# Patient Record
Sex: FEMALE | Race: BLACK OR AFRICAN AMERICAN | NOT HISPANIC OR LATINO | URBAN - METROPOLITAN AREA
[De-identification: names, ages, dates, MRNs, and addresses within clinical notes are randomized per-mention and may not be internally consistent; named-entity substitution may affect disease eponyms.]

---
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
## 2024-03-03 ENCOUNTER — HOSPITAL ENCOUNTER (EMERGENCY)
Facility: HOSPITAL | Age: 19
Discharge: HOME | End: 2024-03-03
Payer: COMMERCIAL

## 2024-03-03 VITALS
HEIGHT: 67 IN | TEMPERATURE: 98.2 F | RESPIRATION RATE: 18 BRPM | WEIGHT: 272 LBS | HEART RATE: 88 BPM | BODY MASS INDEX: 42.69 KG/M2 | SYSTOLIC BLOOD PRESSURE: 143 MMHG | OXYGEN SATURATION: 100 % | DIASTOLIC BLOOD PRESSURE: 86 MMHG

## 2024-03-03 DIAGNOSIS — J02.9 SORE THROAT: Primary | ICD-10-CM

## 2024-03-03 DIAGNOSIS — R68.89 FLU-LIKE SYMPTOMS: ICD-10-CM

## 2024-03-03 PROBLEM — E66.01 SEVERE OBESITY (MULTI): Status: ACTIVE | Noted: 2024-03-03

## 2024-03-03 PROBLEM — H93.A1 SUBJECTIVE PULSATILE TINNITUS OF RIGHT EAR: Status: ACTIVE | Noted: 2024-03-03

## 2024-03-03 PROBLEM — G43.909 MIGRAINE HEADACHE: Status: ACTIVE | Noted: 2024-03-03

## 2024-03-03 LAB
FLUAV RNA RESP QL NAA+PROBE: NOT DETECTED
FLUBV RNA RESP QL NAA+PROBE: NOT DETECTED
HCG UR QL IA.RAPID: NEGATIVE
HETEROPH AB SERPLBLD QL IA.RAPID: NEGATIVE
S PYO DNA THROAT QL NAA+PROBE: NOT DETECTED
SARS-COV-2 RNA RESP QL NAA+PROBE: NOT DETECTED

## 2024-03-03 PROCEDURE — 81025 URINE PREGNANCY TEST: CPT | Performed by: PHYSICIAN ASSISTANT

## 2024-03-03 PROCEDURE — 36415 COLL VENOUS BLD VENIPUNCTURE: CPT | Performed by: PHYSICIAN ASSISTANT

## 2024-03-03 PROCEDURE — 87636 SARSCOV2 & INF A&B AMP PRB: CPT | Performed by: PHYSICIAN ASSISTANT

## 2024-03-03 PROCEDURE — 2500000005 HC RX 250 GENERAL PHARMACY W/O HCPCS: Performed by: PHYSICIAN ASSISTANT

## 2024-03-03 PROCEDURE — 99283 EMERGENCY DEPT VISIT LOW MDM: CPT

## 2024-03-03 PROCEDURE — 87651 STREP A DNA AMP PROBE: CPT | Performed by: PHYSICIAN ASSISTANT

## 2024-03-03 PROCEDURE — 2500000001 HC RX 250 WO HCPCS SELF ADMINISTERED DRUGS (ALT 637 FOR MEDICARE OP): Performed by: PHYSICIAN ASSISTANT

## 2024-03-03 PROCEDURE — 86308 HETEROPHILE ANTIBODY SCREEN: CPT | Performed by: PHYSICIAN ASSISTANT

## 2024-03-03 RX ORDER — IBUPROFEN 600 MG/1
600 TABLET ORAL EVERY 8 HOURS PRN
Qty: 20 TABLET | Refills: 0 | Status: SHIPPED | OUTPATIENT
Start: 2024-03-03

## 2024-03-03 RX ORDER — ONDANSETRON 4 MG/1
4 TABLET, ORALLY DISINTEGRATING ORAL EVERY 8 HOURS PRN
Qty: 10 TABLET | Refills: 0 | Status: SHIPPED | OUTPATIENT
Start: 2024-03-03

## 2024-03-03 RX ORDER — IBUPROFEN 600 MG/1
600 TABLET ORAL ONCE
Status: COMPLETED | OUTPATIENT
Start: 2024-03-03 | End: 2024-03-03

## 2024-03-03 RX ORDER — ONDANSETRON 4 MG/1
4 TABLET, ORALLY DISINTEGRATING ORAL ONCE
Status: COMPLETED | OUTPATIENT
Start: 2024-03-03 | End: 2024-03-03

## 2024-03-03 RX ADMIN — ONDANSETRON 4 MG: 4 TABLET, ORALLY DISINTEGRATING ORAL at 05:38

## 2024-03-03 RX ADMIN — IBUPROFEN 600 MG: 600 TABLET, FILM COATED ORAL at 05:39

## 2024-03-03 ASSESSMENT — PAIN - FUNCTIONAL ASSESSMENT: PAIN_FUNCTIONAL_ASSESSMENT: 0-10

## 2024-03-03 ASSESSMENT — COLUMBIA-SUICIDE SEVERITY RATING SCALE - C-SSRS
2. HAVE YOU ACTUALLY HAD ANY THOUGHTS OF KILLING YOURSELF?: NO
6. HAVE YOU EVER DONE ANYTHING, STARTED TO DO ANYTHING, OR PREPARED TO DO ANYTHING TO END YOUR LIFE?: NO
1. IN THE PAST MONTH, HAVE YOU WISHED YOU WERE DEAD OR WISHED YOU COULD GO TO SLEEP AND NOT WAKE UP?: NO

## 2024-03-03 ASSESSMENT — PAIN DESCRIPTION - LOCATION: LOCATION: THROAT

## 2024-03-03 ASSESSMENT — PAIN SCALES - GENERAL: PAINLEVEL_OUTOF10: 9

## 2024-03-03 NOTE — ED TRIAGE NOTES
Patient presents to the ER with complaints of sore throat since Wed.  Patient endorses difficulty swallowing and throat is painful when talking and swallowing.

## 2024-03-03 NOTE — ED PROVIDER NOTES
Chief Complaint   Patient presents with    Sore Throat     HPI:   Amira Funk is an 18 y.o. female with migraine disorder who presents to the ED for evaluation of sore throat, body aches, nausea x 3 days.  Patient said around 2300 yesterday evening her symptoms got worse and it became significantly more painful to swallow.  She has tried hot tea and cough drops with no relief.  Rates her pain 8/10.  It is worse with swallowing and no relieving factors.  She endorses sick contacts.  LMP 2/9/2024 but is asking for pregnancy test.  Patient denies chest pain, shortness of breath, palpitations, weakness, syncope, abdominal pain, vomiting, diarrhea, dysuria, hematuria.    Medications: None  Soc HX:  No Known Allergies: NKDA  History reviewed. No pertinent past medical history.  History reviewed. No pertinent surgical history.  No family history on file.     Physical Exam  Vitals and nursing note reviewed.   Constitutional:       General: She is not in acute distress.     Appearance: Normal appearance. She is not ill-appearing or toxic-appearing.      Comments: Elevated BMI   HENT:      Head: Normocephalic and atraumatic.      Right Ear: Tympanic membrane, ear canal and external ear normal. No middle ear effusion. Tympanic membrane is not erythematous.      Left Ear: Tympanic membrane, ear canal and external ear normal.  No middle ear effusion. Tympanic membrane is not erythematous.      Nose: Congestion present.      Mouth/Throat:      Mouth: Mucous membranes are moist.      Pharynx: Uvula midline. Posterior oropharyngeal erythema and uvula swelling present.      Tonsils: No tonsillar exudate or tonsillar abscesses. 2+ on the right. 2+ on the left.   Eyes:      Pupils: Pupils are equal, round, and reactive to light.   Cardiovascular:      Rate and Rhythm: Normal rate and regular rhythm.      Pulses: Normal pulses.      Heart sounds: Normal heart sounds. No murmur heard.  Pulmonary:      Effort: Pulmonary effort is  normal. No respiratory distress.      Breath sounds: Normal breath sounds.   Abdominal:      General: Bowel sounds are normal.      Palpations: Abdomen is soft.      Tenderness: There is no abdominal tenderness. There is no guarding or rebound.   Musculoskeletal:         General: No deformity or signs of injury. Normal range of motion.      Cervical back: Normal range of motion.   Lymphadenopathy:      Cervical: Cervical adenopathy present.   Skin:     General: Skin is warm and dry.      Capillary Refill: Capillary refill takes less than 2 seconds.      Findings: No bruising.   Neurological:      Mental Status: She is alert.      Cranial Nerves: No cranial nerve deficit.      Comments: CN II through XII grossly intact     VS: As documented in the triage note and EMR flowsheet from this visit were reviewed.    External Records Reviewed: I reviewed recent and relevant outside records including: Reviewed neurology note 2/5/2024.  Patient seen for migraine disorder.  She started on Zoloft for migraine prophylaxis and anxiety treatment.  Advised to take ibuprofen 800 mg twice daily for headache.  Blood work ordered.  Encouraged her to start headache diary.  Lab work was obtained.  Vitamin D was low.  TSH is normal.  Renal function normal.      Medical Decision Making:   ED Course as of 03/03/24 0557   Sun Mar 03, 2024   0443 Vitals Reviewed: Afebrile. Hypertensive. Not tachypneic. No hypoxia.   [KA]   0503 Patient is a 18-year-old female presents to the ED for evaluation of sore throat and other viral type symptoms.  On exam tonsils are symmetrically enlarged without exudate.  Bilateral tonsillar lymphadenopathy.  Abdomen is soft and nontender.  Lungs are clear to auscultation I do not feel patient necessitates imaging to evaluate for pneumonia.  Will obtain COVID, flu and strep.  Patient to be given ibuprofen and Zofran.  Obtain hCG. [KA]   0538 COVID, flu, strep are negative.  Of added monoscreen.  Patient does not  want to stay for the results.  Advised to review MyChart.  If positive follow-up with primary care provider.  We discussed supportive care including rest, hydration, Tylenol and/or ibuprofen as needed for pain and fever.  Patient to follow-up with PCP within the next 2 to 5 days and return to ER for any new or worsening symptoms.  She is agreeable. [KA]      ED Course User Index  [KA] Chapis Iraheta PA-C         Diagnoses as of 03/03/24 0557   Sore throat   Flu-like symptoms   Escalation of Care: Appropriate for outpatient management     Counseling: Spoke with the patient and discussed today´s findings, in addition to providing specific details for the plan of care and expected course.  Patient was given the opportunity to ask questions.    Discussed return precautions and importance of follow-up.  Advised to follow-up with PCP.  Advised to return to the ED for changing or worsening symptoms, new symptoms, complaint specific precautions, and precautions listed on the discharge paperwork.  Educated on the common potential side effects of medications prescribed.    I advised the patient that the emergency evaluation and treatment provided today doesn't end their need for medical care. It is very important that they follow-up with their primary care provider or other specialist as instructed.    The plan of care was mutually agreed upon with the patient. The patient and/or family were given the opportunity to ask questions. All questions asked today in the ED were answered to the best of my ability with today's information.    I specifically advised the patient to return to the ED for changing or worsening symptoms, worrisome new symptoms, or for any complaint specific precautions listed on the discharge paperwork.    This patient was cared for in the setting of nationwide stress on resources and staffing.    This report was transcribed using voice recognition software.  Every effort was made to ensure accuracy,  however, inadvertently computerized transcription errors may be present.       Chapis Iraheta PA-C  03/03/24 0557

## 2024-03-03 NOTE — Clinical Note
Amira Funk was seen and treated in our emergency department on 3/3/2024.  She may return to work on 03/06/2024.       If you have any questions or concerns, please don't hesitate to call.      Chapis Iraheta PA-C

## 2024-03-03 NOTE — DISCHARGE INSTRUCTIONS
Please continue supportive care including rest, hydration, Tylenol and/or ibuprofen as needed for pain and fever.  Follow-up with primary care provider within the next 2 to 5 days.  Return to nearest ER for any new or worsening symptoms.  If mono is positive, talk to your primary care about this.      Try mixing 1/8 teaspoon cayenne pepper in a glass of water, gargle, spit and repeat.  May do this every 2 hours as needed for throat pain.  Usually by the time lips stop feeling hot, there is decrease in throat pain.

## 2024-08-31 ENCOUNTER — HOSPITAL ENCOUNTER (EMERGENCY)
Facility: HOSPITAL | Age: 19
Discharge: HOME | End: 2024-08-31
Payer: COMMERCIAL

## 2024-08-31 ENCOUNTER — APPOINTMENT (OUTPATIENT)
Dept: RADIOLOGY | Facility: HOSPITAL | Age: 19
End: 2024-08-31
Payer: COMMERCIAL

## 2024-08-31 VITALS
DIASTOLIC BLOOD PRESSURE: 65 MMHG | TEMPERATURE: 97.1 F | WEIGHT: 249.12 LBS | BODY MASS INDEX: 39.1 KG/M2 | RESPIRATION RATE: 18 BRPM | SYSTOLIC BLOOD PRESSURE: 118 MMHG | OXYGEN SATURATION: 100 % | HEART RATE: 80 BPM | HEIGHT: 67 IN

## 2024-08-31 DIAGNOSIS — S99.911A INJURY OF RIGHT ANKLE, INITIAL ENCOUNTER: Primary | ICD-10-CM

## 2024-08-31 PROCEDURE — 73610 X-RAY EXAM OF ANKLE: CPT | Mod: RIGHT SIDE | Performed by: RADIOLOGY

## 2024-08-31 PROCEDURE — 73630 X-RAY EXAM OF FOOT: CPT | Mod: RIGHT SIDE | Performed by: RADIOLOGY

## 2024-08-31 PROCEDURE — 99284 EMERGENCY DEPT VISIT MOD MDM: CPT

## 2024-08-31 PROCEDURE — 73630 X-RAY EXAM OF FOOT: CPT | Mod: RT

## 2024-08-31 PROCEDURE — 73610 X-RAY EXAM OF ANKLE: CPT | Mod: RT

## 2024-08-31 RX ORDER — ACETAMINOPHEN 325 MG/1
975 TABLET ORAL ONCE
Status: COMPLETED | OUTPATIENT
Start: 2024-08-31 | End: 2024-08-31

## 2024-08-31 RX ADMIN — ACETAMINOPHEN 975 MG: 325 TABLET ORAL at 08:06

## 2024-08-31 ASSESSMENT — COLUMBIA-SUICIDE SEVERITY RATING SCALE - C-SSRS
1. IN THE PAST MONTH, HAVE YOU WISHED YOU WERE DEAD OR WISHED YOU COULD GO TO SLEEP AND NOT WAKE UP?: NO
6. HAVE YOU EVER DONE ANYTHING, STARTED TO DO ANYTHING, OR PREPARED TO DO ANYTHING TO END YOUR LIFE?: NO
2. HAVE YOU ACTUALLY HAD ANY THOUGHTS OF KILLING YOURSELF?: NO

## 2024-08-31 ASSESSMENT — PAIN DESCRIPTION - LOCATION: LOCATION: ANKLE

## 2024-08-31 ASSESSMENT — PAIN DESCRIPTION - ORIENTATION: ORIENTATION: RIGHT

## 2024-08-31 ASSESSMENT — PAIN DESCRIPTION - PAIN TYPE: TYPE: ACUTE PAIN

## 2024-08-31 ASSESSMENT — PAIN SCALES - GENERAL: PAINLEVEL_OUTOF10: 3

## 2024-08-31 ASSESSMENT — PAIN - FUNCTIONAL ASSESSMENT: PAIN_FUNCTIONAL_ASSESSMENT: 0-10

## 2024-08-31 NOTE — Clinical Note
mAira Funk was seen and treated in our emergency department on 8/31/2024.  She may return to work on 09/02/2024.       If you have any questions or concerns, please don't hesitate to call.      Shaista Sellers PA-C

## 2024-08-31 NOTE — ED PROVIDER NOTES
HPI   Chief Complaint   Patient presents with    Ankle Injury       HPI  HPI: This is 18 y.o. female who presents to the ER complaining of a right ankle injury that occurred 2.5 weeks ago on 8/14.  Patient states that she was walking outside on uneven ground when she stepped into a hole that she did not see and twisted the ankle, inverted the foot.  She states that she has had pain and mild swelling over the lateral ankle and foot since the event.  She states that she has been able to ambulate and bear weight on the extremity since the injury, has had no difficulty ambulating, but does have some pain with weightbearing.  Has not been evaluated for this injury yet as she was hoping it would get better on its own.  She states that she assumed the pain would resolve by now, but as she is still having some persistent pain over the lateral ankle and foot with walking, she decided to come into the ED today to rule out a fracture.  Pain has not worsened, but has not resolved, which prompted her visit today.  She states the swelling has improved, states there is still small amount of swelling over the distal lateral ankle, denies having any swelling into the proximal RLE.  She has not taken any medications or tried any remedies for her symptoms.  She denies any wounds or bleeding.  No bruising.  She denies any pain proximal to the ankle, no pain in the calf, tib-fib, knee, thigh, or hip.  She denies any decreased range of motion of the joints of the RLE.  Denies numbness, tingling, or weakness to the extremity.  She denies sustaining any other injuries during the event, did not fully fall to the ground, no head strike or loss of consciousness, denies pain in any other body parts.  Denies any chance of pregnancy.  No other complaints or symptoms voiced.    ROS:  General: No decreased responsiveness, no fever, chills  Neuro: no numbness or tingling  ENMT: No nosebleed  Eyes: No discharge or redness  Skel: + right ankle pain,  no neck or back pain  Cardiac: No chest pain   Resp: No shortness of breath  GI: No abdominal pain  Skin: no rash or wounds, no erythema or ecchymosis  Heme: no bleeding or petechiae    PMH: obesity, migraines, anxiety  Social History: no smoker, no EtOH, no drugs    Physical Exam:  General: Vital signs stable, Pt is alert, no acute distress  Eyes: Conjunctiva normal, EOMs intact  HENMT: Normocephalic, atraumatic.  Moist mucous membranes.   Resp: Respiratory effort is normal, no retractions, no stridor.   CV: Heart is regular rate and rhythm.   Skin: No evidence of trauma, skin is warm and dry. No rashes, lesions or ulcers.  Skel: full range of motion of upper and lower extremities. No midline tenderness over the cervical thoracic or lumbar spine.  RLE: +mild tenderness and mild edema over the lateral malleolus and mild tenderness into the adjacent proximal lateral foot.  No tenderness over the medial malleolus. No edema over the medial malleolus, foot, calf, or tib-fib.  No tenderness over the proximal fibula or 5th metatarsal.  Able to range ankle with minimal pain.  Achilles intact.  Able to wiggle toes.  No tenderness over the calf, tib-fib, or knee.  There is no warmth or erythema, no evidence of cellulitis or septic joint.  Compartments are soft to palpation. Skin is intact. Is neurovascularly intact distally, 2+ DP pulses, cap refill <2 sec, warm and well perfused, sensation intact and equal throughout the BLE.    Neuro: Normal gait, no motor or sensory changes.  Psych: Alert and oriented ×3, judgment is appropriate, normal mood and affect  Physical Exam   ED Triage Vitals [08/31/24 0739]   Temperature Heart Rate Respirations BP   36.1 °C (97 °F) 88 16 (!) 117/95      Pulse Ox Temp src Heart Rate Source Patient Position   100 % -- -- --      BP Location FiO2 (%)     -- --       Physical Exam  ED Course & MDM   Diagnoses as of 08/31/24 0903   Injury of right ankle, initial encounter     Medical Decision  Making  ED course / MDM     Summary:  Patient presented with a right ankle injury that occurred 2.5 weeks ago.  Pain has not fully resolved, so came to the ED for evaluation.  Vital signs are stable, patient is very well-appearing.  Ambulates unassisted with a normal gait, no acute distress.  On exam, there is mild tenderness and mild edema over the lateral malleolus, mild tenderness into the adjacent proximal lateral foot, no edema into the foot, no edema or tenderness proximal to the ankle.  No other areas of edema or tenderness throughout the RLE.  She has minimal pain with range of motion of the ankle, but is able to fully flex and extend all joints of the RLE.  No warmth erythema, no evidence of cellulitis or septic joint.  Neurovascularly intact distally.   Xrays of the right foot and ankle show no acute fractures or dislocations.  Patient was given a dose of Tylenol in the ED which did improve her pain.  Results and differential were discussed in detail with the patient.  History, exam, and imaging findings consistent with an ankle sprain.  Did consider and discussed the option for further workup including ultrasound, but she has no pain or tenderness over the posterior calf or thigh, localized to the foot and ankle, patient agrees ultrasound is not indicated at this time.  She feels reassured with normal x-rays and is eager for discharge.  She was given a CAM boot, and is able to ambulate unassisted without pain.  Instructed to follow-up with her PCP, also given referral to orthopedics if not better in 1 to 2 weeks.  Stable for discharge with outpatient follow-up. Patient was given strict return precautions, understands reasons to return to the ED. Also discussed supportive care instructions. I expressed the importance of outpatient follow up with their PCP. All questions were answered, patient expressed understanding and stated that they would comply.    Impression:  1. See diagnosis    Plan: Homegoing. I  discussed the differential, results, and discharge plan with the patient and family/friend/caregiver. Patient was advised to follow up with PCP or recommended provider in 2-3 days for another evaluation and exam. I emphasized the importance of follow-up with the physician I referred them to in the timeframe recommended.  I explained reasons for the patient to return to the Emergency Department. They agreed that if they feel their condition is worsening,  if symptoms change, get worse, new symptoms develop prior to follow up, or if they have any other concern they should call 911 immediately for further assistance. If there is no improvement in symptoms in the next 24 hours they are advised to return for further evaluation and exam. I also explained the plan and treatment course. We also discussed medications that were prescribed including common side effects and interactions. The patient was advised to abstain from driving, operating heavy machinery, or making significant decisions while taking medications such as opiates and muscle relaxers that may impair this. I gave the patient an opportunity to ask all questions they had and answered all of them accordingly. They understand return precautions and discharge instructions. Patient and family/friend/caregiver/guardian is in agreement with plan, treatment course, and follow up and states verbally that they will comply.     Disposition: Discharge    This note has been transcribed using voice recognition and may contain grammatical errors, misplaced words, incorrect words, incorrect phrases or other errors.   Procedure  Procedures     Shaista Sellers PA-C  08/31/24 5696

## 2024-08-31 NOTE — ED TRIAGE NOTES
Pt to ED for a right ankle injury. Pt was walking outside when she stepped in a hole she didn't notice and injured it. Injury took place 8/14. Pt states she did not feel it was severe enough to seek medical treatment however this week her pain is worse. Pt awake, alert, and ambulatory upon arrival with stable vital signs.

## 2025-01-17 ENCOUNTER — APPOINTMENT (OUTPATIENT)
Dept: PRIMARY CARE | Facility: CLINIC | Age: 20
End: 2025-01-17
Payer: COMMERCIAL

## 2025-03-27 ENCOUNTER — APPOINTMENT (OUTPATIENT)
Dept: OBSTETRICS AND GYNECOLOGY | Facility: CLINIC | Age: 20
End: 2025-03-27
Payer: COMMERCIAL

## 2025-04-02 ENCOUNTER — APPOINTMENT (OUTPATIENT)
Dept: OBSTETRICS AND GYNECOLOGY | Facility: CLINIC | Age: 20
End: 2025-04-02
Payer: COMMERCIAL

## 2025-04-03 ENCOUNTER — APPOINTMENT (OUTPATIENT)
Dept: OBSTETRICS AND GYNECOLOGY | Facility: CLINIC | Age: 20
End: 2025-04-03
Payer: COMMERCIAL

## 2025-04-06 ENCOUNTER — HOSPITAL ENCOUNTER (OUTPATIENT)
Facility: HOSPITAL | Age: 20
Discharge: HOME | End: 2025-04-06
Attending: OBSTETRICS & GYNECOLOGY | Admitting: OBSTETRICS & GYNECOLOGY
Payer: COMMERCIAL

## 2025-04-06 ENCOUNTER — HOSPITAL ENCOUNTER (OUTPATIENT)
Facility: HOSPITAL | Age: 20
End: 2025-04-06
Attending: OBSTETRICS & GYNECOLOGY | Admitting: OBSTETRICS & GYNECOLOGY
Payer: COMMERCIAL

## 2025-04-06 VITALS
SYSTOLIC BLOOD PRESSURE: 119 MMHG | HEIGHT: 67 IN | HEART RATE: 110 BPM | TEMPERATURE: 97 F | BODY MASS INDEX: 40.76 KG/M2 | DIASTOLIC BLOOD PRESSURE: 68 MMHG | OXYGEN SATURATION: 100 % | WEIGHT: 259.7 LBS | RESPIRATION RATE: 18 BRPM

## 2025-04-06 PROBLEM — O99.210 MATERNAL OBESITY, ANTEPARTUM (HHS-HCC): Status: ACTIVE | Noted: 2025-04-06

## 2025-04-06 PROBLEM — Z3A.15 15 WEEKS GESTATION OF PREGNANCY (HHS-HCC): Status: ACTIVE | Noted: 2025-04-06

## 2025-04-06 PROBLEM — O26.892 ABDOMINAL PAIN DURING PREGNANCY IN SECOND TRIMESTER (HHS-HCC): Status: ACTIVE | Noted: 2025-04-06

## 2025-04-06 PROBLEM — R10.9 ABDOMINAL PAIN DURING PREGNANCY IN SECOND TRIMESTER (HHS-HCC): Status: ACTIVE | Noted: 2025-04-06

## 2025-04-06 LAB
APPEARANCE UR: ABNORMAL
BACTERIA #/AREA URNS AUTO: ABNORMAL /HPF
BILIRUB UR STRIP.AUTO-MCNC: NEGATIVE MG/DL
CAOX CRY #/AREA UR COMP ASSIST: ABNORMAL /HPF
COLOR UR: YELLOW
GLUCOSE UR STRIP.AUTO-MCNC: NORMAL MG/DL
KETONES UR STRIP.AUTO-MCNC: ABNORMAL MG/DL
LEUKOCYTE ESTERASE UR QL STRIP.AUTO: ABNORMAL
MUCOUS THREADS #/AREA URNS AUTO: ABNORMAL /LPF
NITRITE UR QL STRIP.AUTO: NEGATIVE
PH UR STRIP.AUTO: 6 [PH]
PROT UR STRIP.AUTO-MCNC: ABNORMAL MG/DL
RBC # UR STRIP.AUTO: NEGATIVE MG/DL
RBC #/AREA URNS AUTO: ABNORMAL /HPF
SP GR UR STRIP.AUTO: 1.03
SQUAMOUS #/AREA URNS AUTO: ABNORMAL /HPF
UROBILINOGEN UR STRIP.AUTO-MCNC: ABNORMAL MG/DL
WBC #/AREA URNS AUTO: ABNORMAL /HPF

## 2025-04-06 PROCEDURE — 99214 OFFICE O/P EST MOD 30 MIN: CPT | Performed by: OBSTETRICS & GYNECOLOGY

## 2025-04-06 PROCEDURE — 99213 OFFICE O/P EST LOW 20 MIN: CPT

## 2025-04-06 PROCEDURE — 87086 URINE CULTURE/COLONY COUNT: CPT | Mod: AHULAB | Performed by: OBSTETRICS & GYNECOLOGY

## 2025-04-06 PROCEDURE — 81001 URINALYSIS AUTO W/SCOPE: CPT | Performed by: OBSTETRICS & GYNECOLOGY

## 2025-04-06 RX ORDER — ONDANSETRON HYDROCHLORIDE 2 MG/ML
4 INJECTION, SOLUTION INTRAVENOUS EVERY 6 HOURS PRN
Status: DISCONTINUED | OUTPATIENT
Start: 2025-04-06 | End: 2025-04-06 | Stop reason: HOSPADM

## 2025-04-06 RX ORDER — HYDRALAZINE HYDROCHLORIDE 20 MG/ML
5 INJECTION INTRAMUSCULAR; INTRAVENOUS ONCE AS NEEDED
Status: DISCONTINUED | OUTPATIENT
Start: 2025-04-06 | End: 2025-04-06 | Stop reason: HOSPADM

## 2025-04-06 RX ORDER — ONDANSETRON 4 MG/1
4 TABLET, FILM COATED ORAL EVERY 6 HOURS PRN
Status: DISCONTINUED | OUTPATIENT
Start: 2025-04-06 | End: 2025-04-06 | Stop reason: HOSPADM

## 2025-04-06 RX ORDER — LIDOCAINE HYDROCHLORIDE 10 MG/ML
0.5 INJECTION, SOLUTION EPIDURAL; INFILTRATION; INTRACAUDAL; PERINEURAL ONCE AS NEEDED
Status: DISCONTINUED | OUTPATIENT
Start: 2025-04-06 | End: 2025-04-06 | Stop reason: HOSPADM

## 2025-04-06 RX ORDER — LABETALOL HYDROCHLORIDE 5 MG/ML
20 INJECTION, SOLUTION INTRAVENOUS ONCE AS NEEDED
Status: DISCONTINUED | OUTPATIENT
Start: 2025-04-06 | End: 2025-04-06 | Stop reason: HOSPADM

## 2025-04-06 SDOH — SOCIAL STABILITY: SOCIAL INSECURITY: HAVE YOU HAD ANY THOUGHTS OF HARMING ANYONE ELSE?: NO

## 2025-04-06 SDOH — SOCIAL STABILITY: SOCIAL INSECURITY: ARE YOU OR HAVE YOU BEEN THREATENED OR ABUSED PHYSICALLY, EMOTIONALLY, OR SEXUALLY BY ANYONE?: NO

## 2025-04-06 SDOH — SOCIAL STABILITY: SOCIAL INSECURITY: VERBAL ABUSE: DENIES

## 2025-04-06 SDOH — SOCIAL STABILITY: SOCIAL INSECURITY: DOES ANYONE TRY TO KEEP YOU FROM HAVING/CONTACTING OTHER FRIENDS OR DOING THINGS OUTSIDE YOUR HOME?: NO

## 2025-04-06 SDOH — ECONOMIC STABILITY: HOUSING INSECURITY: DO YOU FEEL UNSAFE GOING BACK TO THE PLACE WHERE YOU ARE LIVING?: NO

## 2025-04-06 SDOH — HEALTH STABILITY: MENTAL HEALTH: HAVE YOU USED ANY SUBSTANCES (CANABIS, COCAINE, HEROIN, HALLUCINOGENS, INHALANTS, ETC.) IN THE PAST 12 MONTHS?: NO

## 2025-04-06 SDOH — HEALTH STABILITY: MENTAL HEALTH: WISH TO BE DEAD (PAST 1 MONTH): NO

## 2025-04-06 SDOH — SOCIAL STABILITY: SOCIAL INSECURITY: PHYSICAL ABUSE: DENIES

## 2025-04-06 SDOH — SOCIAL STABILITY: SOCIAL INSECURITY: ABUSE SCREEN: ADULT

## 2025-04-06 SDOH — HEALTH STABILITY: MENTAL HEALTH: SUICIDAL BEHAVIOR (LIFETIME): NO

## 2025-04-06 SDOH — HEALTH STABILITY: MENTAL HEALTH: NON-SPECIFIC ACTIVE SUICIDAL THOUGHTS (PAST 1 MONTH): NO

## 2025-04-06 SDOH — HEALTH STABILITY: MENTAL HEALTH: HAVE YOU USED ANY PRESCRIPTION DRUGS OTHER THAN PRESCRIBED IN THE PAST 12 MONTHS?: NO

## 2025-04-06 SDOH — HEALTH STABILITY: MENTAL HEALTH: WERE YOU ABLE TO COMPLETE ALL THE BEHAVIORAL HEALTH SCREENINGS?: YES

## 2025-04-06 SDOH — SOCIAL STABILITY: SOCIAL INSECURITY: ARE THERE ANY APPARENT SIGNS OF INJURIES/BEHAVIORS THAT COULD BE RELATED TO ABUSE/NEGLECT?: NO

## 2025-04-06 SDOH — SOCIAL STABILITY: SOCIAL INSECURITY: HAVE YOU HAD THOUGHTS OF HARMING ANYONE ELSE?: NO

## 2025-04-06 SDOH — SOCIAL STABILITY: SOCIAL INSECURITY: DO YOU FEEL ANYONE HAS EXPLOITED OR TAKEN ADVANTAGE OF YOU FINANCIALLY OR OF YOUR PERSONAL PROPERTY?: NO

## 2025-04-06 SDOH — SOCIAL STABILITY: SOCIAL INSECURITY: HAS ANYONE EVER THREATENED TO HURT YOUR FAMILY OR YOUR PETS?: NO

## 2025-04-06 ASSESSMENT — LIFESTYLE VARIABLES
HOW OFTEN DO YOU HAVE A DRINK CONTAINING ALCOHOL: NEVER
HOW MANY STANDARD DRINKS CONTAINING ALCOHOL DO YOU HAVE ON A TYPICAL DAY: PATIENT DOES NOT DRINK
SKIP TO QUESTIONS 9-10: 1
AUDIT-C TOTAL SCORE: 0
AUDIT-C TOTAL SCORE: 0
HOW OFTEN DO YOU HAVE 6 OR MORE DRINKS ON ONE OCCASION: NEVER

## 2025-04-06 ASSESSMENT — PATIENT HEALTH QUESTIONNAIRE - PHQ9
1. LITTLE INTEREST OR PLEASURE IN DOING THINGS: NOT AT ALL
SUM OF ALL RESPONSES TO PHQ9 QUESTIONS 1 & 2: 0
2. FEELING DOWN, DEPRESSED OR HOPELESS: NOT AT ALL

## 2025-04-06 ASSESSMENT — PAIN SCALES - GENERAL: PAINLEVEL_OUTOF10: 8

## 2025-04-06 NOTE — PROGRESS NOTES
Urinalysis shows evidence of dehydration and possible UTI. Nitrites are negative, but there was 20 ketones (1+), 3+ bacteria and 25 Wendy/ml. Urine culture will be sent for confirmation.

## 2025-04-06 NOTE — H&P
OB Triage H&P    Assessment/Plan    Amira Funk is a 19 y.o.  at 15w6d, OANH: 2025, by Ultrasound, who presents to triage with lightheadedness, sharp lower abdomen pain in early pregnancy and mild headache earlier which is resolved. FHR is 155. She was reassured after education and review of physiology.  Plan  Discharge home after urinalysis is resulted.   -Fetal monitoring reassuring  -Up to date on prenatal care  -Continue routine prenatal care    Dispo  -Patient appropriate for discharge home, agrees with plan  -Return precautions discussed   -Follow up at next scheduled OB appointment or to triage sooner as needed        Pregnancy Problems (from 25 to present)       No problems associated with this episode.            Subjective     Denies vaginal bleeding., Denies contractions., Denies leaking of fluid.    She notes intermittent sharp right pelvic pain with motion with no concern for contraction, bleeding. Nausea is improved with frequent eating. She was reassured after discussion of physiologic changes  in pregnancy and need for increased hydration. Physiologic respiratory and musculoskeletal changes in pregnancy were reviewed. Will try Excedrin migraine for headaches if needed. She will keep prenatal visit this week.     Prenatal Provider [facility] CNM    OB History    Para Term  AB Living   1 0 0 0 0 0   SAB IAB Ectopic Multiple Live Births   0 0 0 0 0      # Outcome Date GA Lbr Stuart/2nd Weight Sex Type Anes PTL Lv   1 Current                No past surgical history on file.    Social History     Tobacco Use    Smoking status: Not on file    Smokeless tobacco: Not on file   Substance Use Topics    Alcohol use: Not on file       No Known Allergies    Medications Prior to Admission   Medication Sig Dispense Refill Last Dose/Taking    ibuprofen 600 mg tablet Take 1 tablet (600 mg) by mouth every 8 hours if needed for mild pain (1 - 3) or fever (temp greater than 38.0 C). 20 tablet 0      ondansetron ODT (Zofran-ODT) 4 mg disintegrating tablet Take 1 tablet (4 mg) by mouth every 8 hours if needed for nausea or vomiting. 10 tablet 0      Objective     Last Vitals  Temp Pulse Resp BP MAP O2 Sat   36.1 °C (97 °F) 110 18 119/68 84 100 %     Blood Pressures         4/6/2025  1433             BP: 119/68             Physical Exam  General: NAD, mood appropriate  Cardiopulmonary: warm and well perfused, breathing comfortably on room air  Abdomen: Gravid, non-tender  Extremities: Symmetric      Chaperone Present: Yes.  Chaperone Name/Title: Christoph CHA  Examination Chaperoned: Entire Physical Exam     Fetal Monitoring  Baseline: 155 bpm,   Bedside ultrasound: No    Labs in chart were reviewed.          Prenatal labs reviewed, not remarkable.

## 2025-04-08 LAB — BACTERIA UR CULT: NORMAL
